# Patient Record
Sex: MALE | NOT HISPANIC OR LATINO
[De-identification: names, ages, dates, MRNs, and addresses within clinical notes are randomized per-mention and may not be internally consistent; named-entity substitution may affect disease eponyms.]

---

## 2024-10-29 ENCOUNTER — DASHBOARD ENCOUNTERS (OUTPATIENT)
Age: 34
End: 2024-10-29

## 2024-10-29 ENCOUNTER — OFFICE VISIT (OUTPATIENT)
Dept: URBAN - METROPOLITAN AREA CLINIC 92 | Facility: CLINIC | Age: 34
End: 2024-10-29
Payer: COMMERCIAL

## 2024-10-29 VITALS
TEMPERATURE: 96.9 F | SYSTOLIC BLOOD PRESSURE: 112 MMHG | DIASTOLIC BLOOD PRESSURE: 71 MMHG | HEART RATE: 71 BPM | HEIGHT: 70 IN | WEIGHT: 156.2 LBS | BODY MASS INDEX: 22.36 KG/M2

## 2024-10-29 DIAGNOSIS — Z83.79 FAMILY HISTORY OF BARRETT ESOPHAGUS: ICD-10-CM

## 2024-10-29 DIAGNOSIS — R10.10 UPPER ABDOMINAL PAIN: ICD-10-CM

## 2024-10-29 PROCEDURE — 99204 OFFICE O/P NEW MOD 45 MIN: CPT

## 2024-10-29 RX ORDER — FAMOTIDINE 20 MG/1
1 TABLET AT BEDTIME AS NEEDED TABLET, FILM COATED ORAL ONCE A DAY
Status: ACTIVE | COMMUNITY

## 2024-10-29 NOTE — HPI-TODAY'S VISIT:
Patient is a 33 year old male who presents for stomach pain.  Patient developed upper abdominal pain graded an 8/10 two weeks ago that lasted several days. He tried Tums and Pepcid without benefit. No changes with food either or after a BM. He went to urgent care who then advised he go to the ED if his pain continued. He went to [facility] [place], [place] unremarkable. Labs revealing hyperbilirubinemia which is chronic, otherwise normal. They recommended Pepcid. Currently, his pain is intermittent, graded a 1/10. Denies nausea, vomiting, dysphagia, odynophagia, or constitutional symptoms. Denies recent travel, sick contacts, or abx use.  Denies NSAID use. Notes of intermittent diarrhea. Denies abdominal pain, hematochezia, or melena. Maternal grandmother with hx of colon cancer. Mother with hx of colon polyps. Father with hx of Ortega's esophagus.    Patient denies any cardiac, lung, or kidney issues. No pacemaker/defibrillator, No blood thinner, No home O2. No dialysis.

## 2024-11-12 ENCOUNTER — CLAIMS CREATED FROM THE CLAIM WINDOW (OUTPATIENT)
Dept: URBAN - METROPOLITAN AREA SURGERY CENTER 16 | Facility: SURGERY CENTER | Age: 34
End: 2024-11-12
Payer: COMMERCIAL

## 2024-11-12 ENCOUNTER — CLAIMS CREATED FROM THE CLAIM WINDOW (OUTPATIENT)
Dept: URBAN - METROPOLITAN AREA CLINIC 4 | Facility: CLINIC | Age: 34
End: 2024-11-12
Payer: COMMERCIAL

## 2024-11-12 ENCOUNTER — OFFICE VISIT (OUTPATIENT)
Dept: URBAN - METROPOLITAN AREA SURGERY CENTER 16 | Facility: SURGERY CENTER | Age: 34
End: 2024-11-12

## 2024-11-12 DIAGNOSIS — K21.9 GASTRIC REFLUX: ICD-10-CM

## 2024-11-12 DIAGNOSIS — K29.70 GASTRITIS, UNSPECIFIED, WITHOUT BLEEDING: ICD-10-CM

## 2024-11-12 DIAGNOSIS — K30 FUNCTIONAL DYSPEPSIA: ICD-10-CM

## 2024-11-12 PROCEDURE — 00731 ANES UPR GI NDSC PX NOS: CPT | Performed by: ANESTHESIOLOGY

## 2024-11-12 PROCEDURE — 88312 SPECIAL STAINS GROUP 1: CPT | Performed by: PATHOLOGY

## 2024-11-12 PROCEDURE — 00731 ANES UPR GI NDSC PX NOS: CPT | Performed by: NURSE ANESTHETIST, CERTIFIED REGISTERED

## 2024-11-12 PROCEDURE — 88305 TISSUE EXAM BY PATHOLOGIST: CPT | Performed by: PATHOLOGY

## 2024-11-12 RX ORDER — FAMOTIDINE 20 MG/1
1 TABLET AT BEDTIME AS NEEDED TABLET, FILM COATED ORAL ONCE A DAY
Status: ACTIVE | COMMUNITY

## 2024-12-10 ENCOUNTER — OFFICE VISIT (OUTPATIENT)
Dept: URBAN - METROPOLITAN AREA TELEHEALTH 2 | Facility: TELEHEALTH | Age: 34
End: 2024-12-10
Payer: COMMERCIAL

## 2024-12-10 VITALS
HEART RATE: 60 BPM | HEIGHT: 70 IN | DIASTOLIC BLOOD PRESSURE: 80 MMHG | BODY MASS INDEX: 22.19 KG/M2 | RESPIRATION RATE: 60 BRPM | TEMPERATURE: 98.4 F | WEIGHT: 155 LBS | SYSTOLIC BLOOD PRESSURE: 115 MMHG

## 2024-12-10 DIAGNOSIS — R10.10 UPPER ABDOMINAL PAIN: ICD-10-CM

## 2024-12-10 DIAGNOSIS — Z83.79 FAMILY HISTORY OF BARRETT ESOPHAGUS: ICD-10-CM

## 2024-12-10 PROCEDURE — 99213 OFFICE O/P EST LOW 20 MIN: CPT

## 2024-12-10 RX ORDER — FAMOTIDINE 20 MG/1
1 TABLET AT BEDTIME AS NEEDED TABLET, FILM COATED ORAL ONCE A DAY
Status: ACTIVE | COMMUNITY

## 2024-12-10 NOTE — HPI-TODAY'S VISIT:
Patient is a 34 year old male who presents in follow up for stomach pain.  10/29/24, Patient developed upper abdominal pain graded an 8/10 two weeks ago that lasted several days. He tried Tums and Pepcid without benefit. No changes with food either or after a BM. He went to urgent care who then advised he go to the ED if his pain continued. He went to [facility] 10/14/24, [place] unremarkable. Labs revealing hyperbilirubinemia which is chronic, otherwise normal. They recommended Pepcid. Currently, his pain is intermittent, graded a 1/10. Denies nausea, vomiting, dysphagia, odynophagia, or constitutional symptoms. Denies recent travel, sick contacts, or abx use.  Denies NSAID use. Notes of intermittent diarrhea. Denies abdominal pain, hematochezia, or melena. Maternal grandmother with hx of colon cancer. Mother with hx of colon polyps.   EGD 11/12/24 with Dr. Moran revealed a normal esophagus, stomach, and duodenum, antral bx shows gastropathy, neg. for H. pylori. Father with hx of Ortega's esophagus.  Today, patient reports he feels better. His abdominal pain resolved. He occasionally develops heartburn which he takes Pepcid for as needed. Denies nausea or vomiting.